# Patient Record
Sex: FEMALE | Race: WHITE | URBAN - METROPOLITAN AREA
[De-identification: names, ages, dates, MRNs, and addresses within clinical notes are randomized per-mention and may not be internally consistent; named-entity substitution may affect disease eponyms.]

---

## 2023-01-09 ENCOUNTER — OFFICE VISIT (OUTPATIENT)
Dept: ENDOCRINOLOGY | Age: 43
End: 2023-01-09
Payer: COMMERCIAL

## 2023-01-09 VITALS
BODY MASS INDEX: 38.65 KG/M2 | HEART RATE: 75 BPM | OXYGEN SATURATION: 94 % | SYSTOLIC BLOOD PRESSURE: 111 MMHG | WEIGHT: 240.5 LBS | DIASTOLIC BLOOD PRESSURE: 79 MMHG | RESPIRATION RATE: 16 BRPM | HEIGHT: 66 IN

## 2023-01-09 DIAGNOSIS — R61 SWEATING PROFUSELY: ICD-10-CM

## 2023-01-09 DIAGNOSIS — R79.89 ELEVATED CORTISOL LEVEL: Primary | ICD-10-CM

## 2023-01-09 PROBLEM — E88.810 METABOLIC SYNDROME X: Status: ACTIVE | Noted: 2022-10-17

## 2023-01-09 PROBLEM — E66.9 OBESITY: Status: ACTIVE | Noted: 2022-11-01

## 2023-01-09 PROBLEM — E88.81 INSULIN RESISTANCE: Status: ACTIVE | Noted: 2022-10-17

## 2023-01-09 PROBLEM — E78.5 HYPERLIPIDEMIA: Status: ACTIVE | Noted: 2022-10-17

## 2023-01-09 PROBLEM — E88.81 METABOLIC SYNDROME X: Status: ACTIVE | Noted: 2022-10-17

## 2023-01-09 PROBLEM — E88.819 INSULIN RESISTANCE: Status: ACTIVE | Noted: 2022-10-17

## 2023-01-09 PROCEDURE — 99204 OFFICE O/P NEW MOD 45 MIN: CPT | Performed by: INTERNAL MEDICINE

## 2023-01-09 RX ORDER — FLUOXETINE HYDROCHLORIDE 20 MG/1
CAPSULE ORAL
COMMUNITY

## 2023-01-09 RX ORDER — FLUOXETINE HYDROCHLORIDE 20 MG/1
CAPSULE ORAL
COMMUNITY
Start: 2023-01-07

## 2023-01-09 NOTE — PROGRESS NOTES
Chief Complaint   Patient presents with    Abnormal Lab Results    Weight Management     History of Present Illness: Jesse Antony is a 38 y.o. female seen in referral from MD Sai for discussion related to elevated cortisol levels. Colt Villalpando reports that she has gained roughly 50-70 pounds over the course of the past year and a half despite working with a dietitian and exercising regularly with a . Endorses breaking of her hair, violaceous striae on her legs, fatigue. Had a random serum cortisol level drawn off oral contraceptive that was elevated-we currently cannot find that value. Does have a history of thyromegaly reportedly. Would like to avoid medications if possible for weight loss. Also reports that she is waking up in the middle of the night sweating, menstrual periods have become somewhat irregular and that they are occurring every 6 weeks. History reviewed. No pertinent past medical history. History reviewed. No pertinent surgical history. Current Outpatient Medications   Medication Sig    FLUoxetine (PROzac) 20 mg capsule fluoxetine 20 mg capsule   Take 1 capsule every day by oral route. (Patient not taking: Reported on 1/9/2023)    FLUoxetine (PROzac) 20 mg capsule  (Patient not taking: Reported on 1/9/2023)     No current facility-administered medications for this visit. Allergies   Allergen Reactions    Latex Hives    Ibuprofen Swelling    Pseudoephedrine Other (comments)    Sudafed S.A.  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  [facility]    Review of Systems:  See HPI    Physical Examination:  Visit Vitals  /79   Pulse 75   Resp 16   Ht 5' 6\" (1.676 m)   Wt 240 lb 8 oz (109.1 kg)   SpO2 94%   BMI 38.82 kg/m² - GENERAL: NCAT, Appears well nourished   - EYES: EOMI, non-icteric, no proptosis   - Ear/Nose/Throat: NCAT, no visible inflammation or masses   - CARDIOVASCULAR: no cyanosis, no visible JVD   - RESPIRATORY: respiratory effort normal without any distress or labored breathing   - MUSCULOSKELETAL: Normal ROM of neck and upper extremities observed   - SKIN: Violaceous striae less than 1 cm in thickness anterior thighs  - NEUROLOGIC:  No facial asymmetry (Cranial nerve 7 motor function), No gaze palsy   - PSYCHIATRIC: Normal affect, Normal insight and judgement     Data Reviewed:           Assessment/Plan: This is a very pleasant 44-year-old female seen for discussion related to elevated serum cortisol levels. Leigh Ann and I were not able to find the cortisol level itself. We will obtain 2 midnight salivary cortisol levels and a 24-hour urine cortisol. Repeat prolactin in the fasting state and obtain free T4 with concomitant TSH. FSH/LH/estradiol suggest that Deejay Vazquez is premenopausal.  Obtain plasma metanephrines due to excessive sweating. Discussed the stepwise approach to working up excess cortisol and finding the etiology.     Elevated serum cortisol  - CORTISOL, URINE FREE 24 HR  - SALIVARY CORTISOL (2), TIMED  - TSH 3RD GENERATION  - T4, FREE  - PROLACTIN  - ACTH  - CORTISOL, AM    Night sweats  - METANEPHRINES, PLASMA      Copy sent to:MD Presley    RTC next avail    Juliocesar Douglas MD  [facility]

## 2023-01-24 ENCOUNTER — TELEPHONE (OUTPATIENT)
Dept: ENDOCRINOLOGY | Age: 43
End: 2023-01-24

## 2023-01-24 NOTE — TELEPHONE ENCOUNTER
1/24/2023  3:31 PM      Patient called and left a voice mail at 3:09 pm.Pt stated she is doing the at home saliva collection and wanted to make sure she was doing it correctly.   Pt#[phone]    Thanks,  Brittani Kuhn

## 2023-01-25 NOTE — TELEPHONE ENCOUNTER
Spoke with pt and she wanted to know if she needed to turn the saltiva test in daily or wait until both samples are complete. Pt was made aware that per the test instructions, the test should be turned in within one day of the collection and she voiced understanding to what was stated.

## 2023-02-13 NOTE — LETTER
2/13/2023    Ms. Siu Denijoan Stuart  [address] [facility] [address]      Dear Salbador Amando Davidson:    Please find your most recent results below. Resulted Orders   CORTISOL, URINE FREE 24 HR   Result Value Ref Range    Cortisol free, ug/L 11 Undefined ug/L    Cortisol free, ug/24 hr 13 6 - 42 ug/24 hr    Narrative    Test(s) 004433-Cortisol,F,ug/L,U  was developed and its performance characteristics determined  by [employer]. It has not been cleared or approved by the Food  and Drug Administration.   Performed at:  [address] [facility] [address]  [id number]  : Norberto Ellis MD, Phone:  [phone]   TSH 3RD GENERATION   Result Value Ref Range    TSH 2.030 0.450 - 4.500 uIU/mL    Narrative    Performed at:  [address]  [id number]  : Norberto Ellis MD, Phone:  [phone]   T4, FREE   Result Value Ref Range    T4, Free 1.00 0.82 - 1.77 ng/dL    Narrative    Performed at:  [address]  [id number]  : Norberto Ellis MD, Phone:  [phone]   PROLACTIN   Result Value Ref Range    Prolactin 13.0 4.8 - 23.3 ng/mL    Narrative    Performed at:  [address] [facility] [address]  [id number]  : Norberto Ellis MD, Phone:  [phone]   ACTH   Result Value Ref Range    ACTH, plasma 28.5 7.2 - 63.3 pg/mL    Narrative    Performed at:  [address] [facility] [address]  [id number]  : Norberto Ellis MD, Phone:  [phone]   CORTISOL, AM   Result Value Ref Range    Cortisol, a.m. 11.6 6.2 - 19.4 ug/dL    Narrative    Performed at:  [address] [facility] [address]  [id number]  : Norberto Ellis MD, Phone:  [phone]   METANEPHRINES, PLASMA   Result Value Ref Range    Normetanephrine, free 82.7 0.0 - 210.1 pg/mL    Metanephrine, free 13.0 0.0 - 88.0 pg/mL    Narrative    Test(s) 992145-Ntfqqjeyqmatxiw, Pl; 705875-Zcxfkaemlfxt, Pl  was developed and its performance characteristics determined  by [employer]. It has not been cleared or approved by the Food  and Drug Administration. Performed at:  [address] [facility]  [address]  [id number]  : Laney Perez MD, Phone:  [phone]   SALIVARY CORTISOL (2), TIMED   Result Value Ref Range    Salivary cortisol #1 <0.010 ug/dL    Salivary cortisol #2 <0.010 ug/dL    Narrative    Performed at:  [address] [facility]  [address] [place], [place]  [de-identified]  : Connor Gomes MD, Phone:  [phone]       RECOMMENDATIONS:    Mikki Bryant,    Your salivary corisoll levels have finally returned and are normal as are thyroid, adrenaline, and urine cortisol levels. Your prolactin level is normal here (previously slightly elevated). Let's discuss next steps at your visit. No additional testing is needed at this time.     Please call me if you have any questions: [phone]    Sincerely,      Magalys Ibrahim MD

## 2023-05-24 RX ORDER — FLUOXETINE HYDROCHLORIDE 20 MG/1
CAPSULE ORAL
COMMUNITY
Start: 2023-01-07

## 2023-08-23 ENCOUNTER — OFFICE VISIT (OUTPATIENT)
Age: 43
End: 2023-08-23
Payer: COMMERCIAL

## 2023-08-23 VITALS
SYSTOLIC BLOOD PRESSURE: 117 MMHG | BODY MASS INDEX: 44.17 KG/M2 | DIASTOLIC BLOOD PRESSURE: 81 MMHG | OXYGEN SATURATION: 98 % | WEIGHT: 225 LBS | HEART RATE: 93 BPM | RESPIRATION RATE: 16 BRPM | HEIGHT: 60 IN

## 2023-08-23 DIAGNOSIS — Z86.39 HISTORY OF THYROID NODULE: Primary | ICD-10-CM

## 2023-08-23 DIAGNOSIS — R79.89 HIGH SERUM CORTISOL: ICD-10-CM

## 2023-08-23 PROCEDURE — 99214 OFFICE O/P EST MOD 30 MIN: CPT | Performed by: INTERNAL MEDICINE

## 2024-08-19 ENCOUNTER — OFFICE VISIT (OUTPATIENT)
Age: 44
End: 2024-08-19

## 2024-08-19 VITALS
HEART RATE: 71 BPM | TEMPERATURE: 98.3 F | RESPIRATION RATE: 18 BRPM | BODY MASS INDEX: 33.22 KG/M2 | HEIGHT: 65 IN | SYSTOLIC BLOOD PRESSURE: 121 MMHG | DIASTOLIC BLOOD PRESSURE: 83 MMHG | WEIGHT: 199.4 LBS | OXYGEN SATURATION: 97 %

## 2024-08-19 DIAGNOSIS — J00 ACUTE NASOPHARYNGITIS: Primary | ICD-10-CM

## 2024-08-19 ASSESSMENT — ENCOUNTER SYMPTOMS
WHEEZING: 0
ABDOMINAL PAIN: 0
SORE THROAT: 1
COUGH: 1
RHINORRHEA: 1
SHORTNESS OF BREATH: 0
SINUS PAIN: 0
SINUS PRESSURE: 0

## 2024-08-19 NOTE — PROGRESS NOTES
Sarah Jacobsen (:  1980) is a 43 y.o. female,New patient, here for evaluation of the following chief complaint(s):  Sinus Problem, Cough, and Congestion (Onset of symptoms x one week   otc Tylenol )        Assessment    1. Acute nasopharyngitis     Plan    Findings consistent with a viral illness, with symptoms for past one week.  Covid home testing x 2 were negative.There is no evidence of purulent sinus discharge, adventitious breath sounds or other findings to suggest a bacterial component. Recommendation to treat fever, with appropriate antipyretics along with adequate oral hydration, and rest were reviewed. Reviewed use of Coricidin HBP as she tells me Sudafed causes blood pressure elevation. This will help dry up nasal secretions and stuffiness she is experiencing. Patient should monitor symptoms, if development of lethargy or abdominal pain present he should seek re-evaluation. Otherwise, I reviewed acute symptoms, especially fever, usually begin to resolve in 72 hours, though milder symptoms, particularly runny nose, cough, fatigue may last 7 to 10 days or longer.    I have discussed the results of my assessment, diagnosis and treatment plan with the patient. The patient also understands that early in the process of an illness, an Urgent Care work-up can be falsely reassuring. Therefore, if symptoms change, worsen or do not resolve and remain persistent, they should return to Urgent Care or seek further evaluation in the ED for immediate assessment. Additionally, they should continue care with their Primary provider. No further questions remained and patient was discharged to home.        Subjective    HPI  43-year-old female presents to clinic today with complaint of cough and congestion lasting approximately 1 week.  Patient states she has been using Tylenol off-and-on for symptoms.  Patient reports sinus drainage but no fever and she has no ear pain.  She does have a sore throat that seems to come

## 2024-08-19 NOTE — PATIENT INSTRUCTIONS
You have been diagnosed with an Upper respiratory viral infection. It is important to monitor your fever and take Tylenol and/or ibuprofen to keep your fever down and reduce body aches. For nasal congestion, Flonase nasal spray may be used for nasal stuffiness. To dry up nasal secretions you may use Coricidin HBP. Mucinex helps this secretions and cough syrup with dextromethorphan is helpful.     It is also important to maintain good hydration, drink at least 64 ounces of fluid, water, juice, gingerale and gatorade are good choices. Avoid drinks with caffeine as they do not hydrate. Monitor for good urine output.     If fever develops, you develop abdominal pain, vomiting or shortness of breath, or do not  improve, please call PCP or go to nearest ED.     Your fever usually resolves in 48 to 72 hours. Other symptoms, such as cough and nasal stuffiness usually resolve in 7 to 10 days, but may last longer.    Thank you for coming in to the [facility] today. I hope you are feeling better soon!